# Patient Record
(demographics unavailable — no encounter records)

---

## 2019-03-12 NOTE — NUR
REMOVED ALL PT'S PERSONAL BELONGINGS FROM ROOM AND PLACED IN SECURITY LOCKER (1 
BAG), GARAGE DOORS DOWN. PROVIDED PT WITH URINE CUP, PT UNABLE TO VOID AT THIS 
TIME. PT DENIES ANY SI/HI THOUGHTS AT THIS TIME. ERP TO SEE PT, AWAITING ORDERS

-------------------------------------------------------------------------------

Addendum: 03/13/19 at 0003 by NOAM

-------------------------------------------------------------------------------

charge rn notified of si status

## 2019-03-12 NOTE — NUR
pt resting on gurney, room remains secured, denies needs, nad. awaiting tele 
psych consult at this time

## 2019-03-13 NOTE — NUR
-------------------------------------------------------------------------------

            *** Note undone in [facility] - 03/13/19 at 0253 by RINA ***             

-------------------------------------------------------------------------------

Spoke with telepysc pycsiatris

## 2019-03-13 NOTE — NUR
Spoke with [facility] psyciatrist and writer gave him the name and number of 
friend of Zara. Psyciatrist will contact friend so Eden can be released 
to him. Pt is currently resting in bed.

## 2019-03-13 NOTE — NUR
Received patient and patient report from JOSE G Esparza. Pt is resting comfortably 
awaiting telepsyc. Pt states that all of her needs are met. Sitter in place.

## 2019-03-13 NOTE — NUR
Pt sleeping and in no apparent distress. Chest rising symetrically. Sitter in 
place. Telepsyc monitor in place. Will continue to monitor.

## 2019-03-13 NOTE — NUR
Nurse received follow-up call from psyciatrist and he recommended that pt be 
released under the care of her friend. Psyciatrist stated that he would fax 
over recommendations shortly. Pt is currently in resting in bed. Sitter at 
bedside.

## 2019-03-13 NOTE — NUR
Education materials and follow-up paperwork given and explained to patient. Pt 
verbalized understanding. Pt collected her belongings and discharged with 
friend.

## 2019-04-18 NOTE — NUR
genaro RN: RN at bedside to kiya huerta MD unable to complete exam d/t 
pt's discomfort. MD states that pt does not have any external rash or lesions, 
pt does have lesion inside of vagina. MD instructing pt to follow up w/ OBGYN. 
pt in bed, NAD, no needs at this time, awaiting DC. Report to JOSE G Escobar.

## 2019-04-18 NOTE — NUR
PT LAYING ON GURNEY.  PT PROVIDED WITH SELF CLEANING SUPPLIES.  NO IV TO DC.  
REVIEWED DC INSTRUCTIONS WITH PT.  UNDERSTANDING VERBALIZED.  PT LEFT AMB, GAIT 
STEADY.